# Patient Record
Sex: MALE | Race: WHITE | NOT HISPANIC OR LATINO | Employment: OTHER | URBAN - METROPOLITAN AREA
[De-identification: names, ages, dates, MRNs, and addresses within clinical notes are randomized per-mention and may not be internally consistent; named-entity substitution may affect disease eponyms.]

---

## 2018-11-29 ENCOUNTER — TRANSFERRED RECORDS (OUTPATIENT)
Dept: HEALTH INFORMATION MANAGEMENT | Facility: CLINIC | Age: 73
End: 2018-11-29
Payer: MEDICARE

## 2019-03-19 ENCOUNTER — TRANSFERRED RECORDS (OUTPATIENT)
Dept: HEALTH INFORMATION MANAGEMENT | Facility: CLINIC | Age: 74
End: 2019-03-19
Payer: COMMERCIAL

## 2019-05-28 ENCOUNTER — TRANSFERRED RECORDS (OUTPATIENT)
Dept: HEALTH INFORMATION MANAGEMENT | Facility: CLINIC | Age: 74
End: 2019-05-28
Payer: COMMERCIAL

## 2020-11-22 ENCOUNTER — TRANSFERRED RECORDS (OUTPATIENT)
Dept: HEALTH INFORMATION MANAGEMENT | Facility: CLINIC | Age: 75
End: 2020-11-22
Payer: COMMERCIAL

## 2020-12-22 ENCOUNTER — TRANSFERRED RECORDS (OUTPATIENT)
Dept: HEALTH INFORMATION MANAGEMENT | Facility: CLINIC | Age: 75
End: 2020-12-22
Payer: COMMERCIAL

## 2021-08-25 ENCOUNTER — MEDICAL CORRESPONDENCE (OUTPATIENT)
Dept: HEALTH INFORMATION MANAGEMENT | Facility: CLINIC | Age: 76
End: 2021-08-25

## 2021-09-15 ENCOUNTER — TELEPHONE (OUTPATIENT)
Dept: ORTHOPEDICS | Facility: CLINIC | Age: 76
End: 2021-09-15

## 2021-09-15 ENCOUNTER — TRANSCRIBE ORDERS (OUTPATIENT)
Dept: OTHER | Age: 76
End: 2021-09-15

## 2021-09-15 DIAGNOSIS — M48.061 SPINAL STENOSIS OF LUMBAR REGION: Primary | ICD-10-CM

## 2021-09-15 NOTE — TELEPHONE ENCOUNTER
Writer called and talked with patient on the phone and offered an earlier apt with Dr. Keith versus waiting til 11/24/21 with Dr. Aviles. Pt has been rescheduled to 9/22/2021 with Dr. Keith.    Sherita Roblero LPN

## 2021-09-16 NOTE — TELEPHONE ENCOUNTER
RECORDS RECEIVED FROM: DO NOT MOVE  Spinal stenosis of LUMBAR region / Taina Spencer MD @ [facility] / Medicare / MRI done at [facility] in [place] (6/24/21), pt will bring imaging by hand   DATE RECEIVED: Nov 24, 2021     NOTES STATUS DETAILS   OFFICE NOTE from referring provider Care Everywhere Taina Spencer MD     OFFICE NOTE from other specialist Care Everywhere Miguel Hdz, PT    Joe Brand L Ac ACUPUNCTURE   MRI In process-R    XRAYS (IMAGES & REPORTS) In process-R    09/29/21   10:48 AM   FAXED REQUEST TO [facility] FOR IMAGES  [phone]  Krysta Baker CMA    09/16/21   3:23 PM   CALLED [facility] FOR IMAGES  Krysta Baker CMA

## 2021-11-15 DIAGNOSIS — M48.061 LUMBAR SPINAL STENOSIS: Primary | ICD-10-CM

## 2021-11-17 ENCOUNTER — TELEPHONE (OUTPATIENT)
Dept: ORTHOPEDICS | Facility: CLINIC | Age: 76
End: 2021-11-17
Payer: COMMERCIAL

## 2021-11-24 ENCOUNTER — ANCILLARY PROCEDURE (OUTPATIENT)
Dept: GENERAL RADIOLOGY | Facility: CLINIC | Age: 76
End: 2021-11-24
Attending: ORTHOPAEDIC SURGERY
Payer: COMMERCIAL

## 2021-11-24 ENCOUNTER — TELEPHONE (OUTPATIENT)
Dept: ORTHOPEDICS | Facility: CLINIC | Age: 76
End: 2021-11-24

## 2021-11-24 ENCOUNTER — ANCILLARY PROCEDURE (OUTPATIENT)
Dept: GENERAL RADIOLOGY | Facility: CLINIC | Age: 76
End: 2021-11-24
Attending: PHYSICIAN ASSISTANT
Payer: COMMERCIAL

## 2021-11-24 ENCOUNTER — OFFICE VISIT (OUTPATIENT)
Dept: ORTHOPEDICS | Facility: CLINIC | Age: 76
End: 2021-11-24
Payer: COMMERCIAL

## 2021-11-24 ENCOUNTER — PRE VISIT (OUTPATIENT)
Dept: ORTHOPEDICS | Facility: CLINIC | Age: 76
End: 2021-11-24

## 2021-11-24 VITALS — BODY MASS INDEX: 24.5 KG/M2 | HEIGHT: 72 IN | WEIGHT: 180.9 LBS

## 2021-11-24 DIAGNOSIS — M48.061 SPINAL STENOSIS OF LUMBAR REGION: Primary | ICD-10-CM

## 2021-11-24 DIAGNOSIS — M48.061 SPINAL STENOSIS OF LUMBAR REGION: ICD-10-CM

## 2021-11-24 DIAGNOSIS — M53.3 ACUTE COCCYGEAL PAIN: Primary | ICD-10-CM

## 2021-11-24 DIAGNOSIS — M53.3 ACUTE COCCYGEAL PAIN: ICD-10-CM

## 2021-11-24 DIAGNOSIS — M48.061 SPINAL STENOSIS OF LUMBAR REGION, UNSPECIFIED WHETHER NEUROGENIC CLAUDICATION PRESENT: ICD-10-CM

## 2021-11-24 PROCEDURE — 77073 BONE LENGTH STUDIES: CPT | Performed by: STUDENT IN AN ORGANIZED HEALTH CARE EDUCATION/TRAINING PROGRAM

## 2021-11-24 PROCEDURE — 72082 X-RAY EXAM ENTIRE SPI 2/3 VW: CPT | Performed by: STUDENT IN AN ORGANIZED HEALTH CARE EDUCATION/TRAINING PROGRAM

## 2021-11-24 PROCEDURE — 72170 X-RAY EXAM OF PELVIS: CPT | Performed by: RADIOLOGY

## 2021-11-24 PROCEDURE — 99204 OFFICE O/P NEW MOD 45 MIN: CPT | Performed by: ORTHOPAEDIC SURGERY

## 2021-11-24 PROCEDURE — 72220 X-RAY EXAM SACRUM TAILBONE: CPT | Performed by: RADIOLOGY

## 2021-11-24 RX ORDER — OXYCODONE AND ACETAMINOPHEN 5; 325 MG/1; MG/1
1 TABLET ORAL EVERY 4 HOURS PRN
COMMUNITY
Start: 2021-11-12

## 2021-11-24 ASSESSMENT — MIFFLIN-ST. JEOR: SCORE: 1594.94

## 2021-11-24 NOTE — LETTER
11/24/2021         RE: Livan Herzog  [address] [place] [address]        Dear Colleague,    Thank you for referring your patient, Livan Herzog, to the [facility] [place]. Please see a copy of my visit note below.    REASON FOR CONSULTATION: Consult (Spinal stenosis of LUMBAR region / Taina Spencer MD @ [facility] / )     REFERRING PHYSICIAN: Yfn    PRIMARY CARE PHYSICIAN: Taina Spencer MD @ [facility]     HISTORY OF PRESENT ILLNESS: Livan Herzog is a 76 year old male who presents with buttock pain that has been worsening for the past 3 years.  He noted symptoms started after chiropractic services and have been progressively worsening.  Nothing that he does makes it better. He states that his pain worsens with sitting, and improves whenever he lays flat or stands up. He denies any radicular symptoms down his legs.  He denies any changes to ability to walk.  He denies any weakness whenever he is standing.  He states that he can stand for about 40 minutes without any discomfort.      He denies any numbness to his groin or anus.  He notes troubles with pain control and that nothing that he takes improves things. He is currently taking Percocet, with decreased effectiveness and pain control.  He noted that he had a trial of pain stimulator, that was not helpful at all.     Reviewed note from [facility] 9/21/21 (Dr. Spencer) for details of various prior non-operative treatment options tried:  Medications:  - oxycodone 5mg 3/day  - topiramate 25mg BID  - ibuprofen/ tyelnol PRN  - salonpas, voltaren gel  - gabapentin historical- not helpful  Injections:  - ganglion impar nerve block: 1st time helpful, subsequent blocks not helpful  - SI joint injection: 40% improvement in pain  Physical therapy: tried PT a few months ago. Last point was early April of this year.   Chiropractic care: not helpful  Massage: temporary relief  TENs unit: not helpful  SCS trial:  not helpful    Oswestry (GLORIA) Questionnaire    OSWESTRY DISABILITY INDEX 11/24/2021   Count 9   Sum 18   Oswestry Score (%) 40     PROMIS-10 Scores  Global Mental Health Score: (P) 17  Global Physical Health Score: (P) 15   PROMIS TOTAL - SUBSCORES: (P) 32     Pain Scale: 4 out of 10    Past medical, past surgical, social, family history, medications, and allergies reviewed on the orthopaedic surgery intake form which is scanned into the electronic record with pertinent positives commented on.    No Known Allergies    PMH: Asthma, hypercholesteremia,    No past surgical history on file.    No family history on file.    Social History     Tobacco Use     Smoking status: Not on file     Smokeless tobacco: Not on file   Substance Use Topics     Alcohol use: Not on file       Current Outpatient Medications   Medication     diphenhydrAMINE-acetaminophen (TYLENOL PM)  MG tablet     oxyCODONE-acetaminophen (PERCOCET) 5-325 MG tablet     No current facility-administered medications for this visit.       PHYSICAL EXAM:   Constitutional - Patient is healthy, well-nourished and appears stated age.   Respiratory - Patient is breathing normally and in no respiratory distress.   Skin - No suspicious rashes or lesions.   Psychiatric - Normal mood and affect.   Cardiovascular - Extremities are warm and well perfused.   Eyes - Visual acuity is normal to the written word.   ENT - Hearing intact to the spoken word.   GI - No abdominal distention.   Musculoskeletal - Non-antalgic gait without use of assistive devices.        Thoracic Spine:    Appearance - hypokyphotic and hyperextended     Palpation - Non-tender to palpation            Lumbar Spine:    Appearance - loss of lumbar lordosis     Palpation - Non-tender to palpation    ROM - Full without any worsening pain     Motor -        LOWER EXTREMITY Left Right   Hip flexion 5/5 5/5   Knee flexion 5/5 5/5   Knee extension 5/5 5/5   Ankle dorsiflexion 5/5 5/5   Ankle  plantarflexion 5/5 5/5   Great toe extension 5/5 5/5        Special tests -     Straight leg raise - Negative     LISA - Negative     Neurologic - Sensation intact to light touch bilaterally.      REFLEXES Left Right   Biceps 2+ 2+   Triceps 2+ 2+   Brachioradialis 2+ 2+   Patella 2+ 2+   Ankle jerk 2+ 2+   Babinski Present? Present?       SI joint exam:       Right   Left     Eda Finger Test    -   -     LISA    -   -     Thigh Thrust:   -   +     Pelvic Compression Test    -   +     Palpation    -   -     Pelvic Gapping    -   -     Gaenslen s Test    -   +     Sacral Thrust (SI)   -   -       IMAGING:     MR Lumbar:    Sacral Tarlov cyst.  Lumbar spondylosis.  L5-S1 right severe foraminal stenosis with lateral recess stenosis. Degenerative disc disease from L3-S1 mild bilateral neuroforaminal stenosis at L3-4, L4-5 facet arthropathy with associated disc bulge causing right severe neuroforaminal stenosis and lateral recess stenosis.    XR Scoliosis    Thoracic kyphosis 36.3, Lumbar lordosis 31, L4-S1 lumbar lordosis 21, PI 42.5 with knee flexed        XR Sacrum and Coccyx lateral (sitting/upright)    Angle of sacral coccygeal joint displacement worsens on sitting(flexion) in comparison to extension.         CLINICAL ASSESSMENT:   1.  Valentín is a 76-year-old male experiencing acute on chronic coccydynia     DISCUSSION/PLAN:   Livan notes progressive worsening coccygeal buttock pain.  His x-ray shows motion with sitting in comparison to his standing x-rays.  Denies any radicular symptoms at this time.  He denies any neurogenic claudication.  He notes that he has tried several sessions of physical therapy, has had trial of pain stimulator without any improvement of his symptoms. Recommendations at this time is for referral for sacrococcygeal joint injection. If symptoms improve from XR guided sacrococcygeal injections,  recommended for coccygectomy. He was educated about the risk of this procedure and patients  sometimes have trouble healing due to location of the incision. He was educated to follow up after his injection. He was educated to call if symptoms worsen or change prior to follow up appointment.     - XR guided sacral coccygeal joint injection with local only.   - follow up after injection for symptomatic evaluation.     All questions and concerns were answered to the patient's apparent satisfaction before leaving the clinic.     Thank you for allowing us to see this pleasant patient.  Dr. Aviles and I are happy to be involved in his care.    Respectfully,  Bishop LESLIE Johnson PA-C    I saw and evaluated the patient and developed the plan.  Dipesh Aviles MD      CC    Livan Herzog  [address] [facility] [address]

## 2021-11-24 NOTE — NURSING NOTE
"Reason For Visit:   Chief Complaint   Patient presents with     Consult     Spinal stenosis of LUMBAR region / Taina Spencer MD @ [facility] /        Primary MD: Jennifer Coulter. MD: Taina Spencer MD @ [facility]     ?  No  Occupation Retired.    Date of injury: No  Type of injury: No.    Date of surgery: No  Type of surgery: No    Smoker: No  Request smoking cessation information: No    Ht 1.823 m (5' 11.77\")   Wt 82.1 kg (180 lb 14.4 oz)   BMI 24.69 kg/m      Pain Assessment  Patient Currently in Pain: Yes  0-10 Pain Scale: 4  Primary Pain Location: Back    Oswestry (GLORIA) Questionnaire    OSWESTRY DISABILITY INDEX 11/24/2021   Count 9   Sum 18   Oswestry Score (%) 40          Visual Analog Pain Scale  Back Pain Scale 0-10: 4  Right leg pain: 0  Left leg pain: 0  Neck Pain Scale 0-10: 0  Right arm pain: 0  Left arm pain: 0    Promis 10 Assessment    PROMIS 10 11/24/2021   In general, would you say your health is: Excellent   In general, would you say your quality of life is: Very good   In general, how would you rate your physical health? Good   In general, how would you rate your mental health, including your mood and your ability to think? Very good   In general, how would you rate your satisfaction with your social activities and relationships? Very good   In general, please rate how well you carry out your usual social activities and roles Very good   To what extent are you able to carry out your everyday physical activities such as walking, climbing stairs, carrying groceries, or moving a chair? Mostly   How often have you been bothered by emotional problems such as feeling anxious, depressed or irritable? Never   How would you rate your fatigue on average? None   How would you rate your pain on average?   0 = No Pain  to  10 = Worst Imaginable Pain 4   In general, would you say your health is: 5   In general, would you say your quality of life is: 4   In general, how would you rate your " physical health? 3   In general, how would you rate your mental health, including your mood and your ability to think? 4   In general, how would you rate your satisfaction with your social activities and relationships? 4   In general, please rate how well you carry out your usual social activities and roles. (This includes activities at home, at work and in your community, and responsibilities as a parent, child, spouse, employee, friend, etc.) 4   To what extent are you able to carry out your everyday physical activities such as walking, climbing stairs, carrying groceries, or moving a chair? 4   In the past 7 days, how often have you been bothered by emotional problems such as feeling anxious, depressed, or irritable? 1   In the past 7 days, how would you rate your fatigue on average? 1   In the past 7 days, how would you rate your pain on average, where 0 means no pain, and 10 means worst imaginable pain? 4   Global Mental Health Score 17   Global Physical Health Score 15   PROMIS TOTAL - SUBSCORES 32                Sherita Roblero LPN

## 2021-11-24 NOTE — TELEPHONE ENCOUNTER
Writer called and talked with pt on the phone. Writer will call start of next week if 12/16/21 will work or if 12/29/21 will be possible.    Sherita Roblero LPN

## 2021-11-24 NOTE — TELEPHONE ENCOUNTER
[facility] Call Center    Phone Message    May a detailed message be left on voicemail: yes     Reason for Call: Other: pt has an injection scheduled fo 12/2 and was told to call to schedule a follow up with Dr. Aviles. first appt is 1/13 but he thought it should be sooner. please call pt     Action Taken: Message routed to:  [facility] ([facility] ortho    Travel Screening: Not Applicable

## 2021-11-24 NOTE — PROGRESS NOTES
REASON FOR CONSULTATION: Consult (Spinal stenosis of LUMBAR region / Taina Spencer MD @ [facility] / )     REFERRING PHYSICIAN: Yfn    PRIMARY CARE PHYSICIAN: Taina Spencer MD @ [facility]     HISTORY OF PRESENT ILLNESS: Livan Herzog is a 76 year old male who presents with buttock pain that has been worsening for the past 3 years.  He noted symptoms started after chiropractic services and have been progressively worsening.  Nothing that he does makes it better. He states that his pain worsens with sitting, and improves whenever he lays flat or stands up. He denies any radicular symptoms down his legs.  He denies any changes to ability to walk.  He denies any weakness whenever he is standing.  He states that he can stand for about 40 minutes without any discomfort.      He denies any numbness to his groin or anus.  He notes troubles with pain control and that nothing that he takes improves things. He is currently taking Percocet, with decreased effectiveness and pain control.  He noted that he had a trial of pain stimulator, that was not helpful at all.     Reviewed note from [facility] Palliative Care 9/21/21 (Dr. Spencer) for details of various prior non-operative treatment options tried:  Medications:  - oxycodone 5mg 3/day  - topiramate 25mg BID  - ibuprofen/ tyelnol PRN  - salonpas, voltaren gel  - gabapentin historical- not helpful  Injections:  - ganglion impar nerve block: 1st time helpful, subsequent blocks not helpful  - SI joint injection: 40% improvement in pain  Physical therapy: tried PT a few months ago. Last point was early April of this year.   Chiropractic care: not helpful  Massage: temporary relief  TENs unit: not helpful  SCS trial: not helpful    Oswestry (GLORIA) Questionnaire    OSWESTRY DISABILITY INDEX 11/24/2021   Count 9   Sum 18   Oswestry Score (%) 40     PROMIS-10 Scores  Global Mental Health Score: (P) 17  Global Physical Health Score: (P) 15   PROMIS TOTAL - SUBSCORES: (P) 32      Pain Scale: 4 out of 10    Past medical, past surgical, social, family history, medications, and allergies reviewed on the orthopaedic surgery intake form which is scanned into the electronic record with pertinent positives commented on.    No Known Allergies    PMH: Asthma, hypercholesteremia,    No past surgical history on file.    No family history on file.    Social History     Tobacco Use     Smoking status: Not on file     Smokeless tobacco: Not on file   Substance Use Topics     Alcohol use: Not on file       Current Outpatient Medications   Medication     diphenhydrAMINE-acetaminophen (TYLENOL PM)  MG tablet     oxyCODONE-acetaminophen (PERCOCET) 5-325 MG tablet     No current facility-administered medications for this visit.       PHYSICAL EXAM:   Constitutional - Patient is healthy, well-nourished and appears stated age.   Respiratory - Patient is breathing normally and in no respiratory distress.   Skin - No suspicious rashes or lesions.   Psychiatric - Normal mood and affect.   Cardiovascular - Extremities are warm and well perfused.   Eyes - Visual acuity is normal to the written word.   ENT - Hearing intact to the spoken word.   GI - No abdominal distention.   Musculoskeletal - Non-antalgic gait without use of assistive devices.        Thoracic Spine:    Appearance - hypokyphotic and hyperextended     Palpation - Non-tender to palpation            Lumbar Spine:    Appearance - loss of lumbar lordosis     Palpation - Non-tender to palpation    ROM - Full without any worsening pain     Motor -        LOWER EXTREMITY Left Right   Hip flexion 5/5 5/5   Knee flexion 5/5 5/5   Knee extension 5/5 5/5   Ankle dorsiflexion 5/5 5/5   Ankle plantarflexion 5/5 5/5   Great toe extension 5/5 5/5        Special tests -     Straight leg raise - Negative     LISA - Negative     Neurologic - Sensation intact to light touch bilaterally.      REFLEXES Left Right   Biceps 2+ 2+   Triceps 2+ 2+   Brachioradialis  2+ 2+   Patella 2+ 2+   Ankle jerk 2+ 2+   Babinski Present? Present?       SI joint exam:       Right   Left     Eda Finger Test    -   -     LISA    -   -     Thigh Thrust:   -   +     Pelvic Compression Test    -   +     Palpation    -   -     Pelvic Gapping    -   -     Gaenslen s Test    -   +     Sacral Thrust (SI)   -   -       IMAGING:     MR Lumbar:    Sacral Tarlov cyst.  Lumbar spondylosis.  L5-S1 right severe foraminal stenosis with lateral recess stenosis. Degenerative disc disease from L3-S1 mild bilateral neuroforaminal stenosis at L3-4, L4-5 facet arthropathy with associated disc bulge causing right severe neuroforaminal stenosis and lateral recess stenosis.    XR Scoliosis    Thoracic kyphosis 36.3, Lumbar lordosis 31, L4-S1 lumbar lordosis 21, PI 42.5 with knee flexed        XR Sacrum and Coccyx lateral (sitting/upright)    Angle of sacral coccygeal joint displacement worsens on sitting(flexion) in comparison to extension.         CLINICAL ASSESSMENT:   1.  Valentín is a 76-year-old male experiencing acute on chronic coccydynia     DISCUSSION/PLAN:   Livan notes progressive worsening coccygeal buttock pain.  His x-ray shows motion with sitting in comparison to his standing x-rays.  Denies any radicular symptoms at this time.  He denies any neurogenic claudication.  He notes that he has tried several sessions of physical therapy, has had trial of pain stimulator without any improvement of his symptoms. Recommendations at this time is for referral for sacrococcygeal joint injection. If symptoms improve from XR guided sacrococcygeal injections,  recommended for coccygectomy. He was educated about the risk of this procedure and patients sometimes have trouble healing due to location of the incision. He was educated to follow up after his injection. He was educated to call if symptoms worsen or change prior to follow up appointment.     - XR guided sacral coccygeal joint injection with local only.   -  follow up after injection for symptomatic evaluation.     All questions and concerns were answered to the patient's apparent satisfaction before leaving the clinic.     Thank you for allowing us to see this pleasant patient.  Dr. Aviles and I are happy to be involved in his care.    Respectfully,  Bishop LESLIE Johnson PA-C    I saw and evaluated the patient and developed the plan.  Dipesh Aviles MD      CC  Copy to patient     [address] [facility] [address]

## 2021-11-29 ENCOUNTER — TELEPHONE (OUTPATIENT)
Dept: ORTHOPEDICS | Facility: CLINIC | Age: 76
End: 2021-11-29
Payer: COMMERCIAL

## 2021-11-29 NOTE — TELEPHONE ENCOUNTER
Writer called and talked with pt on the phone. Pt was able to et scheduled for a follow up apt on 12/16/2021 at 1:30 pm.     Sherita Roblero LPN

## 2021-12-02 ENCOUNTER — TRANSFERRED RECORDS (OUTPATIENT)
Dept: HEALTH INFORMATION MANAGEMENT | Facility: CLINIC | Age: 76
End: 2021-12-02
Payer: COMMERCIAL

## 2021-12-16 ENCOUNTER — ANCILLARY PROCEDURE (OUTPATIENT)
Dept: GENERAL RADIOLOGY | Facility: CLINIC | Age: 76
End: 2021-12-16
Attending: ORTHOPAEDIC SURGERY
Payer: COMMERCIAL

## 2021-12-16 ENCOUNTER — OFFICE VISIT (OUTPATIENT)
Dept: ORTHOPEDICS | Facility: CLINIC | Age: 76
End: 2021-12-16
Payer: COMMERCIAL

## 2021-12-16 VITALS — BODY MASS INDEX: 24.79 KG/M2 | HEIGHT: 72 IN | WEIGHT: 183 LBS

## 2021-12-16 DIAGNOSIS — M48.061 SPINAL STENOSIS OF LUMBAR REGION: Primary | ICD-10-CM

## 2021-12-16 DIAGNOSIS — M48.061 SPINAL STENOSIS OF LUMBAR REGION: ICD-10-CM

## 2021-12-16 DIAGNOSIS — M53.3 ACUTE COCCYGEAL PAIN: Primary | ICD-10-CM

## 2021-12-16 PROCEDURE — 72100 X-RAY EXAM L-S SPINE 2/3 VWS: CPT | Performed by: RADIOLOGY

## 2021-12-16 PROCEDURE — 72082 X-RAY EXAM ENTIRE SPI 2/3 VW: CPT | Performed by: STUDENT IN AN ORGANIZED HEALTH CARE EDUCATION/TRAINING PROGRAM

## 2021-12-16 PROCEDURE — 72170 X-RAY EXAM OF PELVIS: CPT | Performed by: RADIOLOGY

## 2021-12-16 PROCEDURE — 77073 BONE LENGTH STUDIES: CPT | Performed by: STUDENT IN AN ORGANIZED HEALTH CARE EDUCATION/TRAINING PROGRAM

## 2021-12-16 PROCEDURE — 99213 OFFICE O/P EST LOW 20 MIN: CPT | Performed by: ORTHOPAEDIC SURGERY

## 2021-12-16 ASSESSMENT — MIFFLIN-ST. JEOR: SCORE: 1602.59

## 2021-12-16 NOTE — PROGRESS NOTES
"REASON FOR VISIT: RECHECK    REFERRING PHYSICIAN: Referred Self     PRIMARY CARE PHYSICIAN: Yanet Coulter    HISTORY OF PRESENT ILLNESS: Livan Herzog is a 76 year old male who presents for follow-up, last seen 11/24/21 for buttock pain x 3 years. Last time we recommended XR guided sacrococcygeal injection which was done at [facility], 10-20% relief. When it was performed in May 2019, he had 100% pain relief. He did have better response initially to impar blocks. When he has a large BM, the pain is improved. Pressure on the region alleviates the pain. Sitting is the worst position.     Reviewed note from [facility] Palliative Care 9/21/21 (Dr. Spencer) for details of various prior non-operative treatment options tried:  Medications:  - oxycodone 5mg 3/day  - topiramate 25mg BID  - ibuprofen/ tyelnol PRN  - salonpas, voltaren gel  - gabapentin historical- not helpful    Injections:  - ganglion impar nerve block: 1st time helpful, subsequent blocks not helpful  - SI joint injection: 40% improvement in pain  Physical therapy: tried PT a few months ago. Last point was early April of this year.   Chiropractic care: not helpful  Massage: temporary relief  TENs unit: not helpful  SCS trial: not helpful     Oswestry (GLORIA) Questionnaire    OSWESTRY DISABILITY INDEX 12/16/2021   Count 9   Sum 21   Oswestry Score (%) 46.67     PROMIS-10 Scores  Global Mental Health Score: (P) 14  Global Physical Health Score: (P) 12   PROMIS TOTAL - SUBSCORES: (P) 26      Visual Analog Scale (VAS) Questionnaire    VISUAL ANALOG PAIN SCALE 12/16/2021   Back Pain Scale 0-10 4.5   Right leg pain 0   Left leg pain 0   Neck Pain Scale 0-10 0   Right arm pain 0   Left arm pain 0      PHYSICAL EXAM:    Vitals: Ht 1.82 m (5' 11.65\")   Wt 83 kg (183 lb)   BMI 25.06 kg/m      Constitutional: Patient is healthy, well-nourished and appears stated age.    Respiratory: Patient is breathing normally and in no respiratory distress.    Skin: No suspicious rashes or " lesions.      Musculoskeletal: grossly intact in LE     Spine: pain localizes to sacrococcygeal joint  SI joint exam:   Right Left   Eda Finger Test  - -   LISA  - -   Thigh Thrust: - -   Pelvic Compression Test  - -   Palpation  - -   Pelvic Gapping  - -   Gaenslen s Test  - -     IMAGING:   The following imaging was independently reviewed and interpreted in clinic. See full radiologic report in chart.  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 This surgery has 20-30 % complication rate. Since there is a relationship of his pain to BMs and he has a response to the impar block, we will refer him to colorectal surgery to see if there could be a colon or rectal source of pain.    Follow up prn.    All questions and concerns were answered to the patient's apparent satisfaction before leaving the clinic. We used models, the patients imaging, and drawn diagrams to explain the pathophysiology, and treatments options including surgical and non-surgical.     Thank you for allowing Dr. Aviles and I to participate in the care of Livan Herzog. The above plan was formulated by Dr. Aviles who also saw and examined the patient.     Respectfully,  Susana Whitmore PA-C        I saw and evaluated the patient and developed the plan.  Dipesh Aviles MD

## 2021-12-16 NOTE — NURSING NOTE
"Reason For Visit:   Chief Complaint   Patient presents with     RECHECK     f/u after injection 12/2/2021        Primary MD: Yanet Coulter  Ref. MD: Est    ?  No  Occupation Retired.     Date of injury: No  Type of injury: No.     Date of surgery: No  Type of surgery: No     Smoker: No  Request smoking cessation information: No    Ht 1.82 m (5' 11.65\")   Wt 83 kg (183 lb)   BMI 25.06 kg/m      Pain Assessment  Patient Currently in Pain: Yes  0-10 Pain Scale: 5  Primary Pain Location: Back    Oswestry (GLORIA) Questionnaire    OSWESTRY DISABILITY INDEX 12/16/2021   Count 9   Sum 21   Oswestry Score (%) 46.67        Visual Analog Pain Scale  Back Pain Scale 0-10: 4.5  Right leg pain: 0  Left leg pain: 0  Neck Pain Scale 0-10: 0  Right arm pain: 0  Left arm pain: 0    Promis 10 Assessment    PROMIS 10 12/16/2021   In general, would you say your health is: Good   In general, would you say your quality of life is: Good   In general, how would you rate your physical health? Poor   In general, how would you rate your mental health, including your mood and your ability to think? Good   In general, how would you rate your satisfaction with your social activities and relationships? Good   In general, please rate how well you carry out your usual social activities and roles Good   To what extent are you able to carry out your everyday physical activities such as walking, climbing stairs, carrying groceries, or moving a chair? Moderately   How often have you been bothered by emotional problems such as feeling anxious, depressed or irritable? Never   How would you rate your fatigue on average? None   How would you rate your pain on average?   0 = No Pain  to  10 = Worst Imaginable Pain 5   In general, would you say your health is: 3   In general, would you say your quality of life is: 3   In general, how would you rate your physical health? 1   In general, how would you rate your mental health, including your mood " and your ability to think? 3   In general, how would you rate your satisfaction with your social activities and relationships? 3   In general, please rate how well you carry out your usual social activities and roles. (This includes activities at home, at work and in your community, and responsibilities as a parent, child, spouse, employee, friend, etc.) 3   To what extent are you able to carry out your everyday physical activities such as walking, climbing stairs, carrying groceries, or moving a chair? 3   In the past 7 days, how often have you been bothered by emotional problems such as feeling anxious, depressed, or irritable? 1   In the past 7 days, how would you rate your fatigue on average? 1   In the past 7 days, how would you rate your pain on average, where 0 means no pain, and 10 means worst imaginable pain? 5   Global Mental Health Score 14   Global Physical Health Score 12   PROMIS TOTAL - SUBSCORES 26                Sherita Roblero LPN

## 2021-12-16 NOTE — LETTER
12/16/2021         RE: Livan Herzog  [address] [place] [address]        Dear Colleague,    Thank you for referring your patient, Livan Herzog, to the [facility] [place]. Please see a copy of my visit note below.    REASON FOR VISIT: RECHECK    REFERRING PHYSICIAN: Referred Self     PRIMARY CARE PHYSICIAN: Yanet Coulter A    HISTORY OF PRESENT ILLNESS: Livan Herzog is a 76 year old male who presents for follow-up, last seen 11/24/21 for buttock pain x 3 years. Last time we recommended XR guided sacrococcygeal injection which was done at [facility], 10-20% relief. When it was performed in May 2019, he had 100% pain relief. He did have better response initially to impar blocks. When he has a large BM, the pain is improved. Pressure on the region alleviates the pain. Sitting is the worst position.     Reviewed note from [facility] 9/21/21 (Dr. Spencer) for details of various prior non-operative treatment options tried:  Medications:  - oxycodone 5mg 3/day  - topiramate 25mg BID  - ibuprofen/ tyelnol PRN  - salonpas, voltaren gel  - gabapentin historical- not helpful    Injections:  - ganglion impar nerve block: 1st time helpful, subsequent blocks not helpful  - SI joint injection: 40% improvement in pain  Physical therapy: tried PT a few months ago. Last point was early April of this year.   Chiropractic care: not helpful  Massage: temporary relief  TENs unit: not helpful  SCS trial: not helpful     Oswestry (GLORIA) Questionnaire    OSWESTRY DISABILITY INDEX 12/16/2021   Count 9   Sum 21   Oswestry Score (%) 46.67     PROMIS-10 Scores  Global Mental Health Score: (P) 14  Global Physical Health Score: (P) 12   PROMIS TOTAL - SUBSCORES: (P) 26      Visual Analog Scale (VAS) Questionnaire    VISUAL ANALOG PAIN SCALE 12/16/2021   Back Pain Scale 0-10 4.5   Right leg pain 0   Left leg pain 0   Neck Pain Scale 0-10 0   Right arm pain 0   Left arm pain 0      PHYSICAL  "EXAM:    Vitals: Ht 1.82 m (5' 11.65\")   Wt 83 kg (183 lb)   BMI 25.06 kg/m      Constitutional: Patient is healthy, well-nourished and appears stated age.    Respiratory: Patient is breathing normally and in no respiratory distress.    Skin: No suspicious rashes or lesions.      Musculoskeletal: grossly intact in LE     Spine: pain localizes to sacrococcygeal joint  SI joint exam:   Right Left   Eda Finger Test  - -   LISA  - -   Thigh Thrust: - -   Pelvic Compression Test  - -   Palpation  - -   Pelvic Gapping  - -   Gaenslen s Test  - -     IMAGING:   The following imaging was independently reviewed and interpreted in clinic. See full radiologic report in chart.  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 This surgery has 20-30 % complication rate. Since there is a relationship of his pain to BMs and he has a response to the impar block, we will refer him to colorectal surgery to see if there could be a colon or rectal source of pain.    Follow up prn.    All questions and concerns were answered to the patient's apparent satisfaction before leaving the clinic. We used models, the patients imaging, and drawn diagrams to explain the pathophysiology, and treatments options including surgical and non-surgical.     Thank you for allowing Dr. Aviles and I to participate in the care of Livan Herzog. The above plan was formulated by Dr. Aviles who also saw and examined the patient.     Respectfully,  Susana Whitmore PA-C        I saw and evaluated " the patient and developed the plan.  Dipesh Aviles MD

## 2021-12-28 NOTE — TELEPHONE ENCOUNTER
Diagnosis, Referred by & from: Acute Coccygeal Pain, referred by Dr. Aviles ORTHO   Appt date: 3/2/2022   NOTES STATUS DETAILS   OFFICE NOTE from referring provider  Internal ealth:  12/16/21, 11/24/21 - ORTHO OV with Dr. Aviles   OFFICE NOTE from other specialist   Care Everywhere [facility]:  1/13/22 - PAIN OV with Dr. Spencer  3/5/19 - GEN SURG OV with Dr. Macias   DISCHARGE SUMMARY from hospital  N/A    DISCHARGE REPORT from the ER N/A    OPERATIVE REPORT  N/A    MEDICATION LIST Internal    LABS     ANAL PAP N/A    BIOPSIES/PATHOLOGY RELATED TO DIAGNOSIS N/A    DIAGNOSTIC PROCEDURES     PFC TESTING (from the Pelvic floor center includes Manometry, PDNL, EMG, etc.)    Note: May include imaging in the testing N/A    COLONOSCOPY Care Everywhere [facility]:  12/22/20 - Colonoscopy  3/19/19 - Colonoscopy   UPPER ENDOSCOPY (EGD) N/A    FLEX SIGMOIDOSCOPY  N/A    ERCP N/A    IMAGING (DISC & REPORT)      CT  Received [facility]:  10/3/18 - CT Abd/Pelvis   MRI Received [facility] - [facility]:  4/23/20 - MRI Spine Sacrum Coccyx  12/17/18 - MRI Spine Sacrum Coccyx   XRAY Internal [facility]:  12/16/21 - XR Pelvis  11/24/21 - XR Pelvis  11/24/21 - XR Sacrum and Coccyx   ULTRASOUND (ENDOANAL/ENDORECTAL) N/A      Records Requested  12/28/21    Facility  [facility]  Fax: [phone]  [facility]  Fax: [phone]   Outcome * 12/28/21 10:02 AM Faxed req to [facility] for images/recs to be faxed to the clinic. - Katlyn    * 1/3/22 11:40 AM images received from [facility] and attached to the patient in PACs. - Katlyn    * 1/3/22 12:32 PM Colonoscopies received from [facility] and sent to [facility] to be scanned into the chart. - Katlyn

## 2022-03-02 ENCOUNTER — PRE VISIT (OUTPATIENT)
Dept: SURGERY | Facility: CLINIC | Age: 77
End: 2022-03-02

## 2022-04-03 ENCOUNTER — HEALTH MAINTENANCE LETTER (OUTPATIENT)
Age: 77
End: 2022-04-03

## 2022-10-03 ENCOUNTER — HEALTH MAINTENANCE LETTER (OUTPATIENT)
Age: 77
End: 2022-10-03

## 2023-05-20 ENCOUNTER — HEALTH MAINTENANCE LETTER (OUTPATIENT)
Age: 78
End: 2023-05-20

## 2024-07-27 ENCOUNTER — HEALTH MAINTENANCE LETTER (OUTPATIENT)
Age: 79
End: 2024-07-27